# Patient Record
Sex: FEMALE | Race: BLACK OR AFRICAN AMERICAN | NOT HISPANIC OR LATINO | Employment: FULL TIME | URBAN - METROPOLITAN AREA
[De-identification: names, ages, dates, MRNs, and addresses within clinical notes are randomized per-mention and may not be internally consistent; named-entity substitution may affect disease eponyms.]

---
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
## 2022-02-25 ENCOUNTER — TELEPHONE (OUTPATIENT)
Dept: PEDIATRICS CLINIC | Facility: MEDICAL CENTER | Age: 22
End: 2022-02-25

## 2022-02-25 NOTE — TELEPHONE ENCOUNTER
Patient has transferred to [facility]- Alan Ruiz MD     Please remove Dr Mancera from the PCP      Thank you

## 2022-05-26 NOTE — TELEPHONE ENCOUNTER
05/26/22 3:52 PM     Thank you for your request  Your request has been received, reviewed, and the patient chart updated  No [facility] PCP office appointments on file, removed with external PCP attribution note  This message will now be completed      Thank you  Brady Price

## 2023-03-08 ENCOUNTER — APPOINTMENT (OUTPATIENT)
Dept: URGENT CARE | Facility: MEDICAL CENTER | Age: 23
End: 2023-03-08

## 2023-03-09 ENCOUNTER — APPOINTMENT (OUTPATIENT)
Dept: URGENT CARE | Facility: MEDICAL CENTER | Age: 23
End: 2023-03-09